# Patient Record
Sex: FEMALE | Race: WHITE
[De-identification: names, ages, dates, MRNs, and addresses within clinical notes are randomized per-mention and may not be internally consistent; named-entity substitution may affect disease eponyms.]

---

## 2019-05-09 ENCOUNTER — HOSPITAL ENCOUNTER (EMERGENCY)
Dept: HOSPITAL 100 - ED | Age: 24
Discharge: HOME | End: 2019-05-09
Payer: COMMERCIAL

## 2019-05-09 VITALS
DIASTOLIC BLOOD PRESSURE: 82 MMHG | TEMPERATURE: 98 F | RESPIRATION RATE: 16 BRPM | HEART RATE: 84 BPM | OXYGEN SATURATION: 99 % | SYSTOLIC BLOOD PRESSURE: 112 MMHG

## 2019-05-09 VITALS — BODY MASS INDEX: 24.4 KG/M2

## 2019-05-09 DIAGNOSIS — R20.2: ICD-10-CM

## 2019-05-09 DIAGNOSIS — T78.40XA: ICD-10-CM
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
## 2019-05-09 DIAGNOSIS — R21: Primary | ICD-10-CM

## 2019-05-09 PROCEDURE — 99283 EMERGENCY DEPT VISIT LOW MDM: CPT

## 2021-03-08 ENCOUNTER — NURSE TRIAGE (OUTPATIENT)
Dept: OTHER | Facility: CLINIC | Age: 26
End: 2021-03-08

## 2021-03-08 NOTE — TELEPHONE ENCOUNTER
aches, profound fatigue, loss of taste    Protocols used: CORONAVIRUS (COVID-19) DIAGNOSED OR SUSPECTED-ADULT-[place]    Patient reports covid symptoms:  Headache (sinus like headache), body aches, fatigue and loss of taste. Patient denies fever or shortness of breath but does reports that her chest sometimes feels \"heavy\". Patient has a history of an arrhythmia. Recommended patient contact provider and be seen. Provided care advice.